# Patient Record
Sex: FEMALE | Race: NATIVE HAWAIIAN OR OTHER PACIFIC ISLANDER | NOT HISPANIC OR LATINO | Employment: OTHER | URBAN - METROPOLITAN AREA
[De-identification: names, ages, dates, MRNs, and addresses within clinical notes are randomized per-mention and may not be internally consistent; named-entity substitution may affect disease eponyms.]

---

## 2018-07-10 ENCOUNTER — OFFICE VISIT (OUTPATIENT)
Dept: URGENT CARE | Facility: PHYSICIAN GROUP | Age: 73
End: 2018-07-10
Payer: MEDICARE

## 2018-07-10 ENCOUNTER — HOSPITAL ENCOUNTER (OUTPATIENT)
Dept: RADIOLOGY | Facility: MEDICAL CENTER | Age: 73
End: 2018-07-10
Attending: FAMILY MEDICINE
Payer: MEDICARE

## 2018-07-10 VITALS
BODY MASS INDEX: 21.79 KG/M2 | WEIGHT: 123 LBS | OXYGEN SATURATION: 96 % | HEART RATE: 60 BPM | HEIGHT: 63 IN | SYSTOLIC BLOOD PRESSURE: 118 MMHG | RESPIRATION RATE: 14 BRPM | TEMPERATURE: 98.2 F | DIASTOLIC BLOOD PRESSURE: 60 MMHG

## 2018-07-10 DIAGNOSIS — S99.911A INJURY OF RIGHT ANKLE, INITIAL ENCOUNTER: ICD-10-CM

## 2018-07-10 PROCEDURE — 99202 OFFICE O/P NEW SF 15 MIN: CPT | Performed by: FAMILY MEDICINE

## 2018-07-10 PROCEDURE — 73610 X-RAY EXAM OF ANKLE: CPT | Mod: RT

## 2018-07-10 RX ORDER — GLIMEPIRIDE 4 MG/1
4 TABLET ORAL EVERY MORNING
COMMUNITY
End: 2023-01-25

## 2018-07-10 RX ORDER — ATORVASTATIN CALCIUM 10 MG/1
10 TABLET, FILM COATED ORAL NIGHTLY
COMMUNITY
End: 2023-01-25

## 2018-07-10 RX ORDER — LISINOPRIL 20 MG/1
40 TABLET ORAL DAILY
COMMUNITY
End: 2023-01-25

## 2018-07-10 RX ORDER — METOPROLOL SUCCINATE 25 MG/1
25 TABLET, EXTENDED RELEASE ORAL DAILY
COMMUNITY
End: 2023-01-25

## 2018-07-10 ASSESSMENT — ENCOUNTER SYMPTOMS
WEIGHT LOSS: 0
FOCAL WEAKNESS: 0
ROS SKIN COMMENTS: NO ABRASION OR LACERATION
SENSORY CHANGE: 0

## 2018-07-10 NOTE — PROGRESS NOTES
"Subjective:      Melanie Otto is a 72 y.o. female who presents with Ankle Injury (ankle hit by scooter last thursday, swelling, bruising)            5 days right ankle pain and swelling. Stuck directly by scooter. No twisting mechanism. Ambulatory. Pain is 7/10 with direct contact. No radiation. Mild at rest or with walking. No prior injury. Warm and cool compress have been helpful. No other aggravating or alleviating factors.          Review of Systems   Constitutional: Negative for malaise/fatigue and weight loss.   Musculoskeletal:        No knee pain   Skin:        No abrasion or laceration     Neurological: Negative for sensory change and focal weakness.          Objective:     /60   Pulse 60   Temp 36.8 °C (98.2 °F)   Resp 14   Ht 1.6 m (5' 3\")   Wt 55.8 kg (123 lb)   SpO2 96%   BMI 21.79 kg/m²      Physical Exam   Constitutional: She appears well-developed and well-nourished. No distress.   HENT:   Head: Normocephalic.   Musculoskeletal:   R ankle: tender lateral malleolus without obvious deformity. Negative anterior drawer. +STS and bruising.  No medial ankle, foot, or proximal tenderness. Distal neuro/vascular intact. Skin intact.      Skin: Skin is warm and dry. No rash noted.               Assessment/Plan:   Xray: no fracture per radiology    1. Injury of right ankle, initial encounter  DX-ANKLE 3+ VIEWS RIGHT     Contusion    Differential diagnosis, natural history, supportive care, and indications for immediate follow-up discussed at length.     Relative rest, ice, nsaid prn. Elevation and compression prn swelling. Resume activity as tolerated.    Ace wrap placed in clinic.   "

## 2023-01-25 ENCOUNTER — OFFICE VISIT (OUTPATIENT)
Dept: MEDICAL GROUP | Facility: PHYSICIAN GROUP | Age: 78
End: 2023-01-25
Payer: MEDICARE

## 2023-01-25 VITALS
TEMPERATURE: 98.1 F | DIASTOLIC BLOOD PRESSURE: 78 MMHG | WEIGHT: 120.4 LBS | SYSTOLIC BLOOD PRESSURE: 126 MMHG | BODY MASS INDEX: 23.64 KG/M2 | HEIGHT: 60 IN | OXYGEN SATURATION: 98 % | RESPIRATION RATE: 16 BRPM | HEART RATE: 66 BPM

## 2023-01-25 DIAGNOSIS — E11.69 DYSLIPIDEMIA DUE TO TYPE 2 DIABETES MELLITUS (HCC): ICD-10-CM

## 2023-01-25 DIAGNOSIS — E11.42 DIABETIC POLYNEUROPATHY ASSOCIATED WITH TYPE 2 DIABETES MELLITUS (HCC): ICD-10-CM

## 2023-01-25 DIAGNOSIS — Z76.89 ENCOUNTER TO ESTABLISH CARE WITH NEW DOCTOR: ICD-10-CM

## 2023-01-25 DIAGNOSIS — E55.9 VITAMIN D DEFICIENCY: ICD-10-CM

## 2023-01-25 DIAGNOSIS — Z86.73 HISTORY OF TRANSIENT ISCHEMIC ATTACK (TIA): ICD-10-CM

## 2023-01-25 DIAGNOSIS — E78.5 DYSLIPIDEMIA DUE TO TYPE 2 DIABETES MELLITUS (HCC): ICD-10-CM

## 2023-01-25 DIAGNOSIS — I15.2 HYPERTENSION ASSOCIATED WITH DIABETES (HCC): ICD-10-CM

## 2023-01-25 DIAGNOSIS — E11.59 HYPERTENSION ASSOCIATED WITH DIABETES (HCC): ICD-10-CM

## 2023-01-25 PROCEDURE — 99204 OFFICE O/P NEW MOD 45 MIN: CPT | Performed by: INTERNAL MEDICINE

## 2023-01-25 RX ORDER — ROSUVASTATIN CALCIUM 40 MG/1
40 TABLET, COATED ORAL DAILY
COMMUNITY
End: 2023-02-24 | Stop reason: SDUPTHER

## 2023-01-25 RX ORDER — CLOPIDOGREL BISULFATE 75 MG/1
75 TABLET ORAL DAILY
COMMUNITY
End: 2023-02-24 | Stop reason: SDUPTHER

## 2023-01-25 RX ORDER — LISINOPRIL 40 MG/1
80 TABLET ORAL DAILY
COMMUNITY
End: 2023-02-24 | Stop reason: SDUPTHER

## 2023-01-25 RX ORDER — ACETAMINOPHEN 160 MG
TABLET,DISINTEGRATING ORAL DAILY
COMMUNITY

## 2023-01-25 ASSESSMENT — PATIENT HEALTH QUESTIONNAIRE - PHQ9: CLINICAL INTERPRETATION OF PHQ2 SCORE: 0

## 2023-01-25 NOTE — ASSESSMENT & PLAN NOTE
This is a new condition.  Diagnosed in October 2022.  Patient had brain CT scan and MRI done.  Patient reported that she was taking aspirin 81 mg previously however since this condition the patient has been taking Plavix 75 mg daily.  She denies any history of bleeding.

## 2023-01-25 NOTE — ASSESSMENT & PLAN NOTE
Patient presented  Establish care with new primary care provider.  The patient recently relocated from [facility].

## 2023-01-25 NOTE — ASSESSMENT & PLAN NOTE
This is a chronic ongoing condition.  The patient take Crestor 40 mg daily.  No significant side effects reported.  Lab tests ordered for follow-up.

## 2023-01-25 NOTE — PROGRESS NOTES
PRIMARY CARE CLINIC VISIT    Chief complaint:    Establish care  Follow-up diabetes hypertension and hyperlipidemia  Medication review  Requests lab test      History of Present Illness     Encounter to establish care with new doctor  Patient presented  Establish care with new primary care provider.  The patient recently relocated from [facility].    Diabetic polyneuropathy associated with type 2 diabetes mellitus (HCC)  Chronic ongoing condition.  The patient presently taking Jardiance 25 mg daily.  Lantus 10 units at bedtime.  Patient denies significant hypoglycemic symptoms.  Previous lab test show A1c 9.1%.  Recommend diet and exercise.    Dyslipidemia due to type 2 diabetes mellitus (HCC)  This is a chronic ongoing condition.  The patient take Crestor 40 mg daily.  No significant side effects reported.  Lab tests ordered for follow-up.    History of transient ischemic attack (TIA)  This is a new condition.  Diagnosed in October 2022.  Patient had brain CT scan and MRI done.  Patient reported that she was taking aspirin 81 mg previously however since this condition the patient has been taking Plavix 75 mg daily.  She denies any history of bleeding.    Hypertension associated with diabetes (HCC)  Chronic ongoing condition.  The patient is taking lisinopril 80 Mg daily.    Vitamin D deficiency  Chronic ongoing condition.  The patient is taking vitamin D supplementation.  Lab tests ordered for follow-up.    Current Outpatient Medications on File Prior to Visit   Medication Sig Dispense Refill    rosuvastatin (CRESTOR) 40 MG tablet Take 40 mg by mouth every day.      Cholecalciferol (VITAMIN D3) 2000 UNIT Cap Take  by mouth every day.      clopidogrel (PLAVIX) 75 MG Tab Take 75 mg by mouth every day.      insulin glargine 100 UNIT/ML SC SOPN injection Inject 10 Units under the skin every evening.      Empagliflozin 25 MG Tab Take 25 mg by mouth every day.      lisinopril (PRINIVIL) 40 MG tablet Take 80 mg by mouth every  "day.       No current facility-administered medications on file prior to visit.        Allergies: Patient has no known allergies.    ROS  As per HPI above. All other systems reviewed and negative.      Past Medical, Social, and Family history reviewed and updated in EPIC     Objective     /78 (BP Location: Left arm, Patient Position: Sitting, BP Cuff Size: Adult)   Pulse 66   Temp 36.7 °C (98.1 °F) (Temporal)   Resp 16   Ht 1.518 m (4' 11.75\")   Wt 54.6 kg (120 lb 6.4 oz)   SpO2 98%    Body mass index is 23.71 kg/m².    General: alert in no apparent distress.  Cardiovascular: regular rate and rhythm  Pulmonary: lungs : no wheezing   Gastrointestinal: BS present. No obvious mass noted  Monofilament testing with a 10 gram force: sensation intact: decreased bilaterally  Visual Inspection: Feet without maceration, ulcers, fissures.  Pedal pulses: decreased bilaterally           Assessment and Plan     1. Diabetic polyneuropathy associated with type 2 diabetes mellitus (HCC)  Chronic condition.  Control is unclear.  Lab tests ordered for follow-up.  Advised the patient to continue with Jardiance 25 mg daily and Lantus 10 units at bedtime.  Potential side effect of medication discussed with the patient.    A1c goal of 7% discussed.  Pt's education:   -Advised the  benefits of blood glucose monitoring, potential hypoglycemia , medication mode of action/ possible side effects, the effects of exercise, potential acute and chronic conditions related to diabetes.   -Discussed with pt regarding healthy diet and making better choices to help blood sugar.  -Also encouraged pt to continue with regular exercises/walking.       - HEMOGLOBIN A1C; Future  - Basic Metabolic Panel; Future    2. Hypertension associated with diabetes (HCC)  Chronic stable condition.  Advised the patient to continue with lisinopril 80 mg daily.  Continue with sodium restriction and monitor blood pressure regular basis at home  - CBC WITHOUT " DIFFERENTIAL; Future  - TSH; Future  - MICROALBUMIN CREAT RATIO URINE; Future    3. Dyslipidemia due to type 2 diabetes mellitus (HCC)  Condition.  Control is unclear lab tests ordered for follow-up.  Continue with Crestor 40 mg daily.  - ALANINE AMINO-TRANS; Future  - Lipid Profile; Future    4. Vitamin D deficiency  Chronic condition.  Current status unclear lab tests ordered.  Continue with vitamin D 2020  - VITAMIN D,25 HYDROXY (DEFICIENCY); Future    5. History of transient ischemic attack (TIA)  History of TIA October 2022.  Advised the patient continue with Plavix 75 mg daily.  No new symptoms reported.        Other orders  - rosuvastatin (CRESTOR) 40 MG tablet; Take 40 mg by mouth every day.  - Cholecalciferol (VITAMIN D3) 2000 UNIT Cap; Take  by mouth every day.  - clopidogrel (PLAVIX) 75 MG Tab; Take 75 mg by mouth every day.  - insulin glargine 100 UNIT/ML SC SOPN injection; Inject 10 Units under the skin every evening.  - Empagliflozin 25 MG Tab; Take 25 mg by mouth every day.  - lisinopril (PRINIVIL) 40 MG tablet; Take 80 mg by mouth every day.                Please note that this dictation was created using voice recognition software. I have made every reasonable attempt to correct obvious errors, but I expect that there are errors of grammar and possibly content that I did not discover before finalizing the note.    Erasto aNssar MD  Internal Medicine  [facility]

## 2023-01-25 NOTE — ASSESSMENT & PLAN NOTE
Chronic ongoing condition.  The patient is taking vitamin D supplementation.  Lab tests ordered for follow-up.

## 2023-01-25 NOTE — ASSESSMENT & PLAN NOTE
Chronic ongoing condition.  The patient presently taking Jardiance 25 mg daily.  Lantus 10 units at bedtime.  Patient denies significant hypoglycemic symptoms.  Previous lab test show A1c 9.1%.  Recommend diet and exercise.

## 2023-02-24 RX ORDER — LISINOPRIL 40 MG/1
80 TABLET ORAL DAILY
Qty: 30 TABLET | Refills: 0 | Status: SHIPPED | OUTPATIENT
Start: 2023-02-24 | End: 2023-03-26 | Stop reason: SDUPTHER

## 2023-02-24 RX ORDER — CLOPIDOGREL BISULFATE 75 MG/1
75 TABLET ORAL DAILY
Qty: 30 TABLET | Refills: 0 | Status: SHIPPED | OUTPATIENT
Start: 2023-02-24 | End: 2023-03-26 | Stop reason: SDUPTHER

## 2023-02-24 RX ORDER — ROSUVASTATIN CALCIUM 40 MG/1
40 TABLET, COATED ORAL DAILY
Qty: 30 TABLET | Refills: 0 | Status: SHIPPED | OUTPATIENT
Start: 2023-02-24 | End: 2023-03-26 | Stop reason: SDUPTHER

## 2023-02-28 ENCOUNTER — TELEPHONE (OUTPATIENT)
Dept: MEDICAL GROUP | Facility: PHYSICIAN GROUP | Age: 78
End: 2023-02-28
Payer: MEDICARE

## 2023-02-28 NOTE — TELEPHONE ENCOUNTER
Lantus is not on pt formulary,  Preferred medications are Levemir, Toujeo or Tresiba.    Change or prior auth?

## 2023-02-28 NOTE — TELEPHONE ENCOUNTER
DOCUMENTATION OF PAR STATUS:    1. Name of Medication & Dose: insulin glargine 100 unit/ml    2. Name of Prescription Coverage Company & phone #: [employer] [phone]    3. Date Prior Auth Submitted: 02/28/23    4. What information was given to obtain insurance decision? called    5. Prior Auth Status? Pending    6. Patient Notified: yes    Pt ID#[id number]

## 2023-03-14 ENCOUNTER — HOSPITAL ENCOUNTER (OUTPATIENT)
Dept: LAB | Facility: MEDICAL CENTER | Age: 78
End: 2023-03-14
Attending: INTERNAL MEDICINE
Payer: MEDICARE

## 2023-03-14 DIAGNOSIS — E11.59 HYPERTENSION ASSOCIATED WITH DIABETES (HCC): ICD-10-CM

## 2023-03-14 DIAGNOSIS — I15.2 HYPERTENSION ASSOCIATED WITH DIABETES (HCC): ICD-10-CM

## 2023-03-14 DIAGNOSIS — E11.69 DYSLIPIDEMIA DUE TO TYPE 2 DIABETES MELLITUS (HCC): ICD-10-CM

## 2023-03-14 DIAGNOSIS — D64.9 ANEMIA, UNSPECIFIED TYPE: ICD-10-CM

## 2023-03-14 DIAGNOSIS — E78.5 DYSLIPIDEMIA DUE TO TYPE 2 DIABETES MELLITUS (HCC): ICD-10-CM

## 2023-03-14 DIAGNOSIS — E11.42 DIABETIC POLYNEUROPATHY ASSOCIATED WITH TYPE 2 DIABETES MELLITUS (HCC): ICD-10-CM

## 2023-03-14 DIAGNOSIS — E55.9 VITAMIN D DEFICIENCY: ICD-10-CM

## 2023-03-14 DIAGNOSIS — R80.0 ISOLATED PROTEINURIA WITHOUT SPECIFIC MORPHOLOGIC LESION: ICD-10-CM

## 2023-03-14 LAB
25(OH)D3 SERPL-MCNC: 45 NG/ML (ref 30–100)
ALT SERPL-CCNC: 15 U/L (ref 2–50)
ANION GAP SERPL CALC-SCNC: 13 MMOL/L (ref 7–16)
BUN SERPL-MCNC: 26 MG/DL (ref 8–22)
CALCIUM SERPL-MCNC: 9 MG/DL (ref 8.5–10.5)
CHLORIDE SERPL-SCNC: 111 MMOL/L (ref 96–112)
CHOLEST SERPL-MCNC: 114 MG/DL (ref 100–199)
CO2 SERPL-SCNC: 18 MMOL/L (ref 20–33)
CREAT SERPL-MCNC: 1.53 MG/DL (ref 0.5–1.4)
CREAT UR-MCNC: 66.04 MG/DL
ERYTHROCYTE [DISTWIDTH] IN BLOOD BY AUTOMATED COUNT: 46.5 FL (ref 35.9–50)
EST. AVERAGE GLUCOSE BLD GHB EST-MCNC: 200 MG/DL
FASTING STATUS PATIENT QL REPORTED: NORMAL
GFR SERPLBLD CREATININE-BSD FMLA CKD-EPI: 35 ML/MIN/1.73 M 2
GLUCOSE SERPL-MCNC: 124 MG/DL (ref 65–99)
HBA1C MFR BLD: 8.6 % (ref 4–5.6)
HCT VFR BLD AUTO: 37.6 % (ref 37–47)
HDLC SERPL-MCNC: 41 MG/DL
HGB BLD-MCNC: 11.8 G/DL (ref 12–16)
LDLC SERPL CALC-MCNC: 46 MG/DL
MCH RBC QN AUTO: 29.4 PG (ref 27–33)
MCHC RBC AUTO-ENTMCNC: 31.4 G/DL (ref 33.6–35)
MCV RBC AUTO: 93.5 FL (ref 81.4–97.8)
MICROALBUMIN UR-MCNC: 10.6 MG/DL
MICROALBUMIN/CREAT UR: 161 MG/G (ref 0–30)
PLATELET # BLD AUTO: 223 K/UL (ref 164–446)
PMV BLD AUTO: 9.6 FL (ref 9–12.9)
POTASSIUM SERPL-SCNC: 5.2 MMOL/L (ref 3.6–5.5)
RBC # BLD AUTO: 4.02 M/UL (ref 4.2–5.4)
SODIUM SERPL-SCNC: 142 MMOL/L (ref 135–145)
TRIGL SERPL-MCNC: 134 MG/DL (ref 0–149)
TSH SERPL DL<=0.005 MIU/L-ACNC: 1.6 UIU/ML (ref 0.38–5.33)
WBC # BLD AUTO: 4.9 K/UL (ref 4.8–10.8)

## 2023-03-14 PROCEDURE — 83036 HEMOGLOBIN GLYCOSYLATED A1C: CPT | Mod: GA

## 2023-03-14 PROCEDURE — 80061 LIPID PANEL: CPT

## 2023-03-14 PROCEDURE — 80048 BASIC METABOLIC PNL TOTAL CA: CPT

## 2023-03-14 PROCEDURE — 84443 ASSAY THYROID STIM HORMONE: CPT

## 2023-03-14 PROCEDURE — 82570 ASSAY OF URINE CREATININE: CPT

## 2023-03-14 PROCEDURE — 85027 COMPLETE CBC AUTOMATED: CPT

## 2023-03-14 PROCEDURE — 84460 ALANINE AMINO (ALT) (SGPT): CPT

## 2023-03-14 PROCEDURE — 36415 COLL VENOUS BLD VENIPUNCTURE: CPT

## 2023-03-14 PROCEDURE — 82043 UR ALBUMIN QUANTITATIVE: CPT

## 2023-03-14 PROCEDURE — 82306 VITAMIN D 25 HYDROXY: CPT

## 2023-03-17 ENCOUNTER — HOSPITAL ENCOUNTER (OUTPATIENT)
Dept: LAB | Facility: MEDICAL CENTER | Age: 78
End: 2023-03-17
Attending: INTERNAL MEDICINE
Payer: MEDICARE

## 2023-03-17 DIAGNOSIS — D64.9 ANEMIA, UNSPECIFIED TYPE: ICD-10-CM

## 2023-03-17 DIAGNOSIS — R80.0 ISOLATED PROTEINURIA WITHOUT SPECIFIC MORPHOLOGIC LESION: ICD-10-CM

## 2023-03-17 LAB
CREAT UR-MCNC: 40.73 MG/DL
FERRITIN SERPL-MCNC: 172 NG/ML (ref 10–291)
FOLATE SERPL-MCNC: 18.7 NG/ML
IRON SERPL-MCNC: 79 UG/DL (ref 40–170)
PROT UR-MCNC: 18 MG/DL (ref 0–15)
PROT/CREAT UR: 442 MG/G (ref 10–107)
VIT B12 SERPL-MCNC: 556 PG/ML (ref 211–911)

## 2023-03-17 PROCEDURE — 36415 COLL VENOUS BLD VENIPUNCTURE: CPT

## 2023-03-17 PROCEDURE — 82570 ASSAY OF URINE CREATININE: CPT

## 2023-03-17 PROCEDURE — 84156 ASSAY OF PROTEIN URINE: CPT

## 2023-03-17 PROCEDURE — 82728 ASSAY OF FERRITIN: CPT

## 2023-03-17 PROCEDURE — 82607 VITAMIN B-12: CPT

## 2023-03-17 PROCEDURE — 83540 ASSAY OF IRON: CPT

## 2023-03-17 PROCEDURE — 82746 ASSAY OF FOLIC ACID SERUM: CPT

## 2023-03-18 DIAGNOSIS — N18.32 STAGE 3B CHRONIC KIDNEY DISEASE: ICD-10-CM

## 2023-03-18 DIAGNOSIS — E11.22 CKD STAGE 3 DUE TO TYPE 2 DIABETES MELLITUS (HCC): ICD-10-CM

## 2023-03-18 DIAGNOSIS — N18.30 CKD STAGE 3 DUE TO TYPE 2 DIABETES MELLITUS (HCC): ICD-10-CM

## 2023-03-18 DIAGNOSIS — R80.0 ISOLATED PROTEINURIA WITHOUT SPECIFIC MORPHOLOGIC LESION: ICD-10-CM

## 2023-03-22 ENCOUNTER — OFFICE VISIT (OUTPATIENT)
Dept: NEPHROLOGY | Facility: MEDICAL CENTER | Age: 78
End: 2023-03-22
Payer: MEDICARE

## 2023-03-22 VITALS
HEART RATE: 79 BPM | DIASTOLIC BLOOD PRESSURE: 74 MMHG | SYSTOLIC BLOOD PRESSURE: 130 MMHG | HEIGHT: 59 IN | WEIGHT: 119 LBS | TEMPERATURE: 97.6 F | BODY MASS INDEX: 23.99 KG/M2 | OXYGEN SATURATION: 96 %

## 2023-03-22 DIAGNOSIS — N18.32 STAGE 3B CHRONIC KIDNEY DISEASE: ICD-10-CM

## 2023-03-22 DIAGNOSIS — E11.22 TYPE 2 DIABETES MELLITUS WITH STAGE 3B CHRONIC KIDNEY DISEASE, WITH LONG-TERM CURRENT USE OF INSULIN (HCC): ICD-10-CM

## 2023-03-22 DIAGNOSIS — Z79.4 TYPE 2 DIABETES MELLITUS WITH STAGE 3B CHRONIC KIDNEY DISEASE, WITH LONG-TERM CURRENT USE OF INSULIN (HCC): ICD-10-CM

## 2023-03-22 DIAGNOSIS — I10 PRIMARY HYPERTENSION: ICD-10-CM

## 2023-03-22 DIAGNOSIS — N18.32 TYPE 2 DIABETES MELLITUS WITH STAGE 3B CHRONIC KIDNEY DISEASE, WITH LONG-TERM CURRENT USE OF INSULIN (HCC): ICD-10-CM

## 2023-03-22 DIAGNOSIS — R80.0 ISOLATED PROTEINURIA WITHOUT SPECIFIC MORPHOLOGIC LESION: ICD-10-CM

## 2023-03-22 PROCEDURE — 99204 OFFICE O/P NEW MOD 45 MIN: CPT | Performed by: INTERNAL MEDICINE

## 2023-03-22 ASSESSMENT — ENCOUNTER SYMPTOMS
SHORTNESS OF BREATH: 0
FEVER: 0
NAUSEA: 0
VOMITING: 0
CHILLS: 0
HYPERTENSION: 1
COUGH: 0

## 2023-03-22 NOTE — PROGRESS NOTES
"Subjective     Melanie Otto is a 77 y.o. female who presents with Chronic Kidney Disease and Hypertension            The patient is a pleasant 77-year-old lady with past medical history significant for longstanding diabetes(over 20 years), recently diagnosed with chronic kidney disease stage III.  She is originally from [facility] and is visiting her family in [facility]  Patient has no hematuria, no dysuria  No recent use of NSAIDs or IV contrast exposure    Chronic Kidney Disease  This is a chronic problem. The current episode started more than 1 year ago. The problem occurs constantly. The problem has been unchanged. Pertinent negatives include no chest pain, chills, coughing, fever, nausea, urinary symptoms or vomiting.   Hypertension  This is a chronic problem. The current episode started more than 1 year ago. The problem is unchanged. The problem is controlled. Pertinent negatives include no chest pain, malaise/fatigue, peripheral edema or shortness of breath. Risk factors for coronary artery disease include diabetes mellitus and post-menopausal state. Past treatments include ACE inhibitors. The current treatment provides significant improvement. There are no compliance problems.  Hypertensive end-organ damage includes kidney disease. Identifiable causes of hypertension include chronic renal disease.     Review of Systems   Constitutional:  Negative for chills, fever and malaise/fatigue.   Respiratory:  Negative for cough and shortness of breath.    Cardiovascular:  Negative for chest pain and leg swelling.   Gastrointestinal:  Negative for nausea and vomiting.   Genitourinary:  Negative for dysuria, frequency and urgency.   All other systems reviewed and are negative.           Objective     /74 (BP Location: Right arm, Patient Position: Sitting, BP Cuff Size: Adult)   Pulse 79   Temp 36.4 °C (97.6 °F) (Temporal)   Ht 1.499 m (4' 11\")   Wt 54 kg (119 lb)   SpO2 96%   BMI 24.04 kg/m²  "     Physical Exam  Vitals and nursing note reviewed.   Constitutional:       General: She is awake. She is not in acute distress.     Appearance: She is well-developed. She is not ill-appearing or diaphoretic.   HENT:      Head: Normocephalic and atraumatic.      Right Ear: External ear normal.      Left Ear: External ear normal.      Nose: Nose normal. No rhinorrhea.      Mouth/Throat:      Pharynx: No oropharyngeal exudate or posterior oropharyngeal erythema.   Eyes:      General: No scleral icterus.        Right eye: No discharge.         Left eye: No discharge.      Conjunctiva/sclera: Conjunctivae normal.   Neck:      Vascular: No carotid bruit.   Cardiovascular:      Rate and Rhythm: Normal rate and regular rhythm.      Heart sounds: No murmur heard.  Pulmonary:      Effort: Pulmonary effort is normal. No respiratory distress.      Breath sounds: Normal breath sounds.   Abdominal:      General: Abdomen is flat. There is no distension.      Palpations: Abdomen is soft. There is no mass.   Musculoskeletal:         General: No tenderness.      Cervical back: No rigidity. No muscular tenderness.      Right lower leg: No edema.      Left lower leg: No edema.   Skin:     General: Skin is warm and dry.      Coloration: Skin is not jaundiced.   Neurological:      General: No focal deficit present.      Mental Status: She is alert and oriented to person, place, and time. Mental status is at baseline.   Psychiatric:         Mood and Affect: Mood normal.         Behavior: Behavior normal.         Thought Content: Thought content normal.     Past Medical History:   Diagnosis Date    Type 2 diabetes mellitus with hyperlipidemia (HCC) 1/25/2023     Social History     Socioeconomic History    Marital status:      Spouse name: Not on file    Number of children: Not on file    Years of education: Not on file    Highest education level: Not on file   Occupational History    Not on file   Tobacco Use    Smoking status:  Never    Smokeless tobacco: Never   Vaping Use    Vaping Use: Never used   Substance and Sexual Activity    Alcohol use: Not Currently    Drug use: Never    Sexual activity: Not on file   Other Topics Concern    Not on file   Social History Narrative    Not on file     Social Determinants of Health     Financial Resource Strain: Not on file   Food Insecurity: Not on file   Transportation Needs: Not on file   Physical Activity: Not on file   Stress: Not on file   Social Connections: Not on file   Intimate Partner Violence: Not on file   Housing Stability: Not on file     Family History   Problem Relation Age of Onset    Heart Disease Mother      Recent Labs     03/14/23  0908   HDL 41   TRIGLYCERIDE 134   SODIUM 142   POTASSIUM 5.2   CHLORIDE 111   CO2 18*   BUN 26*   CREATININE 1.53*                             Assessment & Plan        1. Primary hypertension  Controlled  Continue same medication regimen  Continue low-sodium diet      2. Stage 3b chronic kidney disease (HCC)  Etiology is most likely diabetic nephropathy  No uremic symptoms  Renal dose of medication  Avoid nephrotoxins  Continue same medication regimen  Patient was advised to call us if symptoms worsen  Recheck labs in 4 to 6 months  Continue SGLT2 inhibitor    3. Type 2 diabetes mellitus with stage 3b chronic kidney disease, with long-term current use of insulin (HCC)  Continue to optimize diabetes control for hemoglobin A1c below 7%  Continue SGLT2 inhibitor       4.  Proteinuria  Secondary diabetic nephropathy  Low protein diet  Continue ACE inhibitor  Continue SGLT2 inhibitor

## 2023-03-23 ENCOUNTER — HOSPITAL ENCOUNTER (OUTPATIENT)
Facility: MEDICAL CENTER | Age: 78
End: 2023-03-23
Attending: INTERNAL MEDICINE
Payer: MEDICARE

## 2023-03-23 PROCEDURE — 82272 OCCULT BLD FECES 1-3 TESTS: CPT

## 2023-03-24 ENCOUNTER — NON-PROVIDER VISIT (OUTPATIENT)
Dept: VASCULAR LAB | Facility: MEDICAL CENTER | Age: 78
End: 2023-03-24
Attending: INTERNAL MEDICINE
Payer: MEDICARE

## 2023-03-24 ENCOUNTER — HOSPITAL ENCOUNTER (OUTPATIENT)
Facility: MEDICAL CENTER | Age: 78
End: 2023-03-24
Attending: INTERNAL MEDICINE
Payer: MEDICARE

## 2023-03-24 DIAGNOSIS — R80.0 ISOLATED PROTEINURIA WITHOUT SPECIFIC MORPHOLOGIC LESION: ICD-10-CM

## 2023-03-24 DIAGNOSIS — D64.9 ANEMIA, UNSPECIFIED TYPE: ICD-10-CM

## 2023-03-24 DIAGNOSIS — E11.42 DIABETIC POLYNEUROPATHY ASSOCIATED WITH TYPE 2 DIABETES MELLITUS (HCC): Chronic | ICD-10-CM

## 2023-03-24 LAB
HEMOCCULT STL QL: NEGATIVE
PROT 24H UR-MCNC: 203 MG/24 HR (ref 30–150)
PROT 24H UR-MRATE: 7 MG/DL (ref 0–15)
SPECIMEN VOL UR: 2900 ML

## 2023-03-24 PROCEDURE — 99213 OFFICE O/P EST LOW 20 MIN: CPT

## 2023-03-24 PROCEDURE — 84156 ASSAY OF PROTEIN URINE: CPT

## 2023-03-24 PROCEDURE — 84166 PROTEIN E-PHORESIS/URINE/CSF: CPT

## 2023-03-24 PROCEDURE — 86335 IMMUNFIX E-PHORSIS/URINE/CSF: CPT

## 2023-03-24 PROCEDURE — 81050 URINALYSIS VOLUME MEASURE: CPT

## 2023-03-24 RX ORDER — LANCETS 30 GAUGE
EACH MISCELLANEOUS
Qty: 100 EACH | Refills: 0 | Status: SHIPPED | OUTPATIENT
Start: 2023-03-24

## 2023-03-24 NOTE — PROGRESS NOTES
"Patient Consult Note    Primary care physician: Erasto Nassar M.D.    Reason for consult: Management of Uncontrolled Type 2 Diabetes    HPI:  Melanie Otto is a 77 y.o. old patient who comes in today for evaluation of above stated problem.  Patient lives in [facility] and receives medical care for DM in [facility]. She is visiting the [facility] and optimizing medical care prior to returning to [facility] in May 2023. Per patient report, A1c has been 8-9% for ~2 years, with >10% prior to that.    Most Recent HbA1c and POCT glucose:   Lab Results   Component Value Date/Time    HBA1C 8.6 (H) 03/14/2023 09:08 AM            Most Recent Scr:  Lab Results   Component Value Date/Time    CREATININE 1.53 (H) 03/14/2023 09:08 AM      Current Diabetes Medication Regimen  SGLT-2 Inhibitor: Empagliflozin 25 mg daily      Basal Insulin: Levemir 12 units daily in the evening     Previous Diabetes Medications and Reason for Discontinuation  Glimepiride - switched to Jardiance   Trulicity - Unknown why switched due to \"not working\" A1c did not come down per report from patient's daughter in [facility]  Metformin - gastrointestinal effects     Pt has home glucometer and proper testing technique - yes, 167 - 200 fasting   Discussed BG Goals: FBG 80 - 130, 2hPP < 180, A1c < 7%    Pt reports blood sugars: 167-200 fasting, will be getting CGM from [facility] and going to test fasting BG in the interim, prescription sent for supplies     Hypoglycemia awareness - yes  Nocturnal hypoglycemia- occasional   Hypoglycemia:  Occasional    Pt's treatment of Hypoglycemia - 15:15 Rule    Current Exercise - Very active, Jazzercise[place], getting [place]   Exercise Goal - Increase exercise in the [facility] as tolerated, resume activity upon return to [facility]     Dietary - small meals   Breakfast - Usually skips breakfast, but working on eating a small snack/meal to help with BG control, encouraging nuts  Dinner - Likes rice, but is consuming very small portions " due to decreased appetite   Snacks - Peppermints, nuts  Drinks - Water    Foot Exam:  Monofilament exam - 03/2023    Preventative Management  BP regimen (ACE/ARB) - Lisinopril 40 mg daily   BP < 140/90 - Yes, 130/74  Statin - Rosuvastatin 40 mg daily   LDL <100 - Yes  Last Retinal Scan - 03/2023  Last Microalbumin/Cr - 03/2023  Last A1c -   Lab Results   Component Value Date/Time    HBA1C 8.6 (H) 03/14/2023 09:08 AM        updated caregaps    Past Medical History:  Patient Active Problem List    Diagnosis Date Noted    CKD stage 3 due to type 2 diabetes mellitus (HCC) 03/18/2023    Proteinuria 03/14/2023    Anemia 03/14/2023    Diabetic polyneuropathy associated with type 2 diabetes mellitus (HCC) 01/25/2023    Hypertension associated with diabetes (HCC) 01/25/2023    Dyslipidemia due to type 2 diabetes mellitus (HCC) 01/25/2023    Vitamin D deficiency 01/25/2023    History of transient ischemic attack (TIA) 01/25/2023       Past Surgical History:  No past surgical history on file.    Allergies:  Patient has no known allergies.    Social History:  Social History     Socioeconomic History    Marital status:      Spouse name: Not on file    Number of children: Not on file    Years of education: Not on file    Highest education level: Not on file   Occupational History    Not on file   Tobacco Use    Smoking status: Never    Smokeless tobacco: Never   Vaping Use    Vaping Use: Never used   Substance and Sexual Activity    Alcohol use: Not Currently    Drug use: Never    Sexual activity: Not on file   Other Topics Concern    Not on file   Social History Narrative    Not on file     Social Determinants of Health     Financial Resource Strain: Not on file   Food Insecurity: Not on file   Transportation Needs: Not on file   Physical Activity: Not on file   Stress: Not on file   Social Connections: Not on file   Intimate Partner Violence: Not on file   Housing Stability: Not on file       Family History:  Family  History   Problem Relation Age of Onset    Heart Disease Mother        Medications:    Current Outpatient Medications:     clopidogrel (PLAVIX) 75 MG Tab, Take 1 Tablet by mouth every day., Disp: 30 Tablet, Rfl: 0    Empagliflozin 25 MG Tab, Take 25 mg by mouth every day., Disp: 30 Tablet, Rfl: 0    insulin glargine 100 UNIT/ML SC SOPN injection, Inject 10 Units under the skin every evening., Disp: 3 Each, Rfl: 5    lisinopril (PRINIVIL) 40 MG tablet, Take 2 Tablets by mouth every day., Disp: 30 Tablet, Rfl: 0    rosuvastatin (CRESTOR) 40 MG tablet, Take 1 Tablet by mouth every day., Disp: 30 Tablet, Rfl: 0    Cholecalciferol (VITAMIN D3) 2000 UNIT Cap, Take  by mouth every day., Disp: , Rfl:     Labs: Reviewed    Physical Examination:  Vital signs: There were no vitals taken for this visit. There is no height or weight on file to calculate BMI.    Assessment and Plan:    1. DM2  Basic physiology of DMII was explained to patient as well as microvascular/macrovascular complications. The importance of increasing physical activity to improve diabetes control was discussed with the patient. Patient was also educated on changing diet and making better choices to help control blood sugar.   Discussed Goals: FBG 80 - 130, 2hPP < 180, a1c < 7.0%   Patient with 20-30 year history of diabetes receiving care in [facility]. Presents with daughter who is also very knowledgeable about DM     - Medication changes:  Continue empagliflozin 25 mg daily   Increase Levemir by 2 units daily until fasting BG is between  and then stop titration   Discussed risks of hypoglycemia with increased insulin regimen     - Lifestyle changes:  Begin testing fasting BG daily and transition to FreeStyle Corie when it is available   Encouraged consistent small meals throughout the day to avoid hypoglycemia   Increase exercise as tolerated with weather changes     Follow Up:  4 weeks    Padma Alfredo, PharmD    CC:   ALFONZO Jean-Baptiste  MD Andrés Guillen, PharmD

## 2023-03-27 ENCOUNTER — APPOINTMENT (OUTPATIENT)
Dept: MEDICAL GROUP | Facility: PHYSICIAN GROUP | Age: 78
End: 2023-03-27
Payer: MEDICARE

## 2023-03-27 RX ORDER — CLOPIDOGREL BISULFATE 75 MG/1
75 TABLET ORAL DAILY
Qty: 30 TABLET | Refills: 0 | Status: SHIPPED | OUTPATIENT
Start: 2023-03-27 | End: 2023-04-27

## 2023-03-27 RX ORDER — LISINOPRIL 40 MG/1
80 TABLET ORAL DAILY
Qty: 30 TABLET | Refills: 0 | Status: SHIPPED | OUTPATIENT
Start: 2023-03-27 | End: 2023-04-03

## 2023-03-27 RX ORDER — ROSUVASTATIN CALCIUM 40 MG/1
40 TABLET, COATED ORAL DAILY
Qty: 30 TABLET | Refills: 0 | Status: SHIPPED | OUTPATIENT
Start: 2023-03-27 | End: 2023-04-30 | Stop reason: SDUPTHER

## 2023-03-29 LAB
ALBUMIN 24H MFR UR ELPH: 29.8 %
ALPHA1 GLOB 24H MFR UR ELPH: 8 %
ALPHA2 GLOB 24H MFR UR ELPH: 12.4 %
B-GLOBULIN 24H MFR UR ELPH: 26.2 %
COLLECT DURATION TIME SPEC: 24 HRS
EER MONOCLONAL PROTEIN STUDY, 24 HOUR U Q5964: ABNORMAL
GAMMA GLOB 24H MFR UR ELPH: 23.6 %
INTERPRETATION UR IFE-IMP: ABNORMAL
M PROTEIN 24H MFR UR ELPH: 0 %
M PROTEIN 24H UR ELPH-MRATE: 0 MG/24 HRS
PROT 24H UR-MRATE: 203 MG/D (ref 40–150)
PROT UR-MCNC: 7 MG/DL
SPECIMEN VOL ?TM UR: 2900 ML

## 2023-03-30 ENCOUNTER — OFFICE VISIT (OUTPATIENT)
Dept: MEDICAL GROUP | Facility: PHYSICIAN GROUP | Age: 78
End: 2023-03-30
Payer: MEDICARE

## 2023-03-30 VITALS
BODY MASS INDEX: 23.87 KG/M2 | HEIGHT: 59 IN | DIASTOLIC BLOOD PRESSURE: 78 MMHG | SYSTOLIC BLOOD PRESSURE: 126 MMHG | WEIGHT: 118.4 LBS | RESPIRATION RATE: 16 BRPM | HEART RATE: 66 BPM | TEMPERATURE: 97.6 F | OXYGEN SATURATION: 96 %

## 2023-03-30 DIAGNOSIS — N18.30 CKD STAGE 3 DUE TO TYPE 2 DIABETES MELLITUS (HCC): ICD-10-CM

## 2023-03-30 DIAGNOSIS — E11.69 DYSLIPIDEMIA DUE TO TYPE 2 DIABETES MELLITUS (HCC): Chronic | ICD-10-CM

## 2023-03-30 DIAGNOSIS — E11.59 HYPERTENSION ASSOCIATED WITH DIABETES (HCC): Chronic | ICD-10-CM

## 2023-03-30 DIAGNOSIS — E11.22 CKD STAGE 3 DUE TO TYPE 2 DIABETES MELLITUS (HCC): ICD-10-CM

## 2023-03-30 DIAGNOSIS — E11.42 DIABETIC POLYNEUROPATHY ASSOCIATED WITH TYPE 2 DIABETES MELLITUS (HCC): Chronic | ICD-10-CM

## 2023-03-30 DIAGNOSIS — I15.2 HYPERTENSION ASSOCIATED WITH DIABETES (HCC): Chronic | ICD-10-CM

## 2023-03-30 DIAGNOSIS — R80.0 ISOLATED PROTEINURIA WITHOUT SPECIFIC MORPHOLOGIC LESION: ICD-10-CM

## 2023-03-30 DIAGNOSIS — E78.5 DYSLIPIDEMIA DUE TO TYPE 2 DIABETES MELLITUS (HCC): Chronic | ICD-10-CM

## 2023-03-30 PROCEDURE — 99214 OFFICE O/P EST MOD 30 MIN: CPT | Performed by: INTERNAL MEDICINE

## 2023-03-30 NOTE — PROGRESS NOTES
PRIMARY CARE CLINIC VISIT    Chief complaint:    Follow-up hypertension and hyponatremia  Proteinuria  Diabetes mellitus      History of Present Illness     Diabetic polyneuropathy associated with type 2 diabetes mellitus (HCC)  Chronic condition.  Uncontrolled.  Recent A1c is 8.6%.  Patient has been seen by pharmacotherapy and her insulin dosage has been increased to 14 units.  Fasting sugar has been in the low 100s recently.  Patient denies significant hypoglycemic symptoms.  Patient also take Jardiance 25 mg daily.  No significant side effects reported.    Proteinuria  Chronic condition.  The patient has been seen by nephrology service.  Recommend low protein diet.  Continue with lisinopril 80 mg daily.    Dyslipidemia due to type 2 diabetes mellitus (HCC)  Chronic ongoing condition.  The patient is taking Crestor 40 Mg daily.  No significant side effects reported.    Hypertension associated with diabetes (HCC)  This is a chronic condition.  Patient is now taking lisinopril 80 mg daily.  Blood pressure has been well controlled.  No significant side effects noted.    CKD stage 3 due to type 2 diabetes mellitus (HCC)  Chronic condition associated with proteinuria.  Patient was seen by nephrology service recently.  She is currently taking lisinopril 80 mg daily.    Current Outpatient Medications on File Prior to Visit   Medication Sig Dispense Refill    clopidogrel (PLAVIX) 75 MG Tab Take 1 Tablet by mouth every day. 30 Tablet 0    Empagliflozin 25 MG Tab Take 25 mg by mouth every day. 30 Tablet 0    insulin glargine 100 UNIT/ML SC SOPN injection Inject 10 Units under the skin every evening. 3 Each 5    lisinopril (PRINIVIL) 40 MG tablet Take 2 Tablets by mouth every day. 30 Tablet 0    rosuvastatin (CRESTOR) 40 MG tablet Take 1 Tablet by mouth every day. 30 Tablet 0    Blood Glucose Meter Kit Test blood sugar as recommended by provider.  blood glucose monitoring kit. 1 Kit 0    Blood Glucose Test Strips Use one   strip to test blood sugar once daily early morning before first meal. 100 Strip 0    Lancets Use one lancet to test blood sugar once daily early morning before first meal. 100 Each 0    Cholecalciferol (VITAMIN D3) 2000 UNIT Cap Take  by mouth every day.       No current facility-administered medications on file prior to visit.        Allergies: Patient has no known allergies.    Current Outpatient Medications Ordered in Epic   Medication Sig Dispense Refill    clopidogrel (PLAVIX) 75 MG Tab Take 1 Tablet by mouth every day. 30 Tablet 0    Empagliflozin 25 MG Tab Take 25 mg by mouth every day. 30 Tablet 0    insulin glargine 100 UNIT/ML SC SOPN injection Inject 10 Units under the skin every evening. 3 Each 5    lisinopril (PRINIVIL) 40 MG tablet Take 2 Tablets by mouth every day. 30 Tablet 0    rosuvastatin (CRESTOR) 40 MG tablet Take 1 Tablet by mouth every day. 30 Tablet 0    Blood Glucose Meter Kit Test blood sugar as recommended by provider.  blood glucose monitoring kit. 1 Kit 0    Blood Glucose Test Strips Use one  strip to test blood sugar once daily early morning before first meal. 100 Strip 0    Lancets Use one lancet to test blood sugar once daily early morning before first meal. 100 Each 0    Cholecalciferol (VITAMIN D3) 2000 UNIT Cap Take  by mouth every day.       No current [facility]-ordered facility-administered medications on file.       Past Medical History:   Diagnosis Date    Type 2 diabetes mellitus with hyperlipidemia (HCC) 1/25/2023       No past surgical history on file.    Family History   Problem Relation Age of Onset    Heart Disease Mother        Social History     Tobacco Use   Smoking Status Never   Smokeless Tobacco Never       Social History     Substance and Sexual Activity   Alcohol Use Not Currently       Review of systems.  As per HPI above. All other systems reviewed and negative.      Past Medical, Social, and Family history reviewed and updated in EPIC     Objective     /78 (BP  "Location: Right arm, Patient Position: Sitting, BP Cuff Size: Adult)   Pulse 66   Temp 36.4 °C (97.6 °F) (Temporal)   Resp 16   Ht 1.499 m (4' 11\")   Wt 53.7 kg (118 lb 6.4 oz)   SpO2 96%    Body mass index is 23.91 kg/m².    General: alert in no apparent distress.  Cardiovascular: regular rate and rhythm  Pulmonary: lungs : no wheezing   Gastrointestinal: BS present. No obvious mass noted        Lab Results   Component Value Date/Time    HBA1C 8.6 (H) 03/14/2023 09:08 AM       Lab Results   Component Value Date/Time    WBC 4.9 03/14/2023 09:08 AM    HEMOGLOBIN 11.8 (L) 03/14/2023 09:08 AM    HEMATOCRIT 37.6 03/14/2023 09:08 AM    MCV 93.5 03/14/2023 09:08 AM    PLATELETCT 223 03/14/2023 09:08 AM         Lab Results   Component Value Date/Time    SODIUM 142 03/14/2023 09:08 AM    POTASSIUM 5.2 03/14/2023 09:08 AM    GLUCOSE 124 (H) 03/14/2023 09:08 AM    BUN 26 (H) 03/14/2023 09:08 AM    CREATININE 1.53 (H) 03/14/2023 09:08 AM       Lab Results   Component Value Date/Time    CHOLSTRLTOT 114 03/14/2023 09:08 AM    LDL 46 03/14/2023 09:08 AM    HDL 41 03/14/2023 09:08 AM    TRIGLYCERIDE 134 03/14/2023 09:08 AM       Lab Results   Component Value Date/Time    ALTSGPT 15 03/14/2023 09:08 AM             Assessment and Plan     1. Diabetic polyneuropathy associated with type 2 diabetes mellitus (HCC)  Chronic condition.  Uncontrolled.  Patient has pending appointment to meet with the pharmacotherapy clinic in a couple weeks to repeat the A1c.  Since the increase of the insulin to 14 units the fasting sugar at home is coming down in the low 100s.  Patient advised to monitor for possible hypoglycemia symptoms.    A1c goal of 7% discussed.  Basic physiology of Diabetes was explained to patient as well as possible microvascular/macrovascular complications.  Pt's education:   The importance of increasing physical activity to improve diabetes control was discussed with the patient.   Patient was also educated on changing " diet and making better choices to help control blood sugar.   Discussed FBG goal of 100-120, 2hr PP <180    Patient reported that she will be moving back to [facility] in a few weeks.  The patient already set up a follow-up appointment with her doctor in [facility].    2. Proteinuria  Chronic condition.  Recommend low protein diet.  Patient will follow-up with her physician in [facility].    3. Dyslipidemia due to type 2 diabetes mellitus (HCC)  Chronic condition.  Continue with Crestor 40 Mg daily.  Recent lipid panel result discussed with the patient and her daughter.  Patient to follow-up with her physician in [facility].    4. Hypertension associated with diabetes (HCC)  Chronic stable condition.  Continue with lisinopril 40 Mg twice daily.  Continue follow-up with her physician in [facility].      5. CKD stage 3 due to type 2 diabetes mellitus (HCC)  Chronic stable condition.  GFR in the 30s.  Recommend low protein diet.  Patient to avoid NSAID.  Continue to follow-up with her physician in [facility].                    Healthcare Maintenance         Patient stated that she already had bone density test in [facility]  Recommend the patient to go to local pharmacy for Pneumovax and Tdap.          Please note that this dictation was created using voice recognition software. I have made every reasonable attempt to correct obvious errors, but I expect that there are errors of grammar and possibly content that I did not discover before finalizing the note.    Erasto Nassar MD  Internal Medicine  [facility]

## 2023-03-30 NOTE — ASSESSMENT & PLAN NOTE
This is a chronic condition.  Patient is now taking lisinopril 80 mg daily.  Blood pressure has been well controlled.  No significant side effects noted.

## 2023-03-30 NOTE — ASSESSMENT & PLAN NOTE
Chronic ongoing condition.  The patient is taking Crestor 40 Mg daily.  No significant side effects reported.

## 2023-03-30 NOTE — ASSESSMENT & PLAN NOTE
Chronic condition.  Uncontrolled.  Recent A1c is 8.6%.  Patient has been seen by pharmacotherapy and her insulin dosage has been increased to 14 units.  Fasting sugar has been in the low 100s recently.  Patient denies significant hypoglycemic symptoms.  Patient also take Jardiance 25 mg daily.  No significant side effects reported.

## 2023-03-30 NOTE — ASSESSMENT & PLAN NOTE
Chronic condition associated with proteinuria.  Patient was seen by nephrology service recently.  She is currently taking lisinopril 80 mg daily.

## 2023-03-30 NOTE — ASSESSMENT & PLAN NOTE
Chronic condition.  The patient has been seen by nephrology service.  Recommend low protein diet.  Continue with lisinopril 80 mg daily.

## 2023-04-03 RX ORDER — LISINOPRIL 40 MG/1
80 TABLET ORAL DAILY
Qty: 60 TABLET | Refills: 0 | Status: CANCELLED | OUTPATIENT
Start: 2023-04-03

## 2023-04-03 RX ORDER — PEN NEEDLE, DIABETIC 30 GX3/16"
NEEDLE, DISPOSABLE MISCELLANEOUS
COMMUNITY
End: 2023-04-03 | Stop reason: SDUPTHER

## 2023-04-03 RX ORDER — PEN NEEDLE, DIABETIC 30 GX3/16"
NEEDLE, DISPOSABLE MISCELLANEOUS
Qty: 100 EACH | Refills: 3 | Status: SHIPPED | OUTPATIENT
Start: 2023-04-03

## 2023-04-03 RX ORDER — LISINOPRIL 40 MG/1
80 TABLET ORAL DAILY
Qty: 60 TABLET | Refills: 5 | Status: SHIPPED | OUTPATIENT
Start: 2023-04-03

## 2023-04-21 ENCOUNTER — NON-PROVIDER VISIT (OUTPATIENT)
Dept: VASCULAR LAB | Facility: MEDICAL CENTER | Age: 78
End: 2023-04-21
Attending: INTERNAL MEDICINE
Payer: MEDICARE

## 2023-04-21 PROCEDURE — 99212 OFFICE O/P EST SF 10 MIN: CPT

## 2023-04-27 RX ORDER — CLOPIDOGREL BISULFATE 75 MG/1
75 TABLET ORAL DAILY
Qty: 30 TABLET | Refills: 3 | Status: SHIPPED | OUTPATIENT
Start: 2023-04-27

## 2023-05-02 RX ORDER — ROSUVASTATIN CALCIUM 40 MG/1
40 TABLET, COATED ORAL DAILY
Qty: 30 TABLET | Refills: 0 | Status: SHIPPED | OUTPATIENT
Start: 2023-05-02 | End: 2023-05-30

## 2023-05-30 RX ORDER — ROSUVASTATIN CALCIUM 40 MG/1
40 TABLET, COATED ORAL DAILY
Qty: 30 TABLET | Refills: 0 | Status: SHIPPED | OUTPATIENT
Start: 2023-05-30

## 2023-11-29 ENCOUNTER — PATIENT MESSAGE (OUTPATIENT)
Dept: HEALTH INFORMATION MANAGEMENT | Facility: OTHER | Age: 78
End: 2023-11-29

## 2024-02-16 ENCOUNTER — PATIENT MESSAGE (OUTPATIENT)
Dept: HEALTH INFORMATION MANAGEMENT | Facility: OTHER | Age: 79
End: 2024-02-16